# Patient Record
Sex: FEMALE | NOT HISPANIC OR LATINO | URBAN - METROPOLITAN AREA
[De-identification: names, ages, dates, MRNs, and addresses within clinical notes are randomized per-mention and may not be internally consistent; named-entity substitution may affect disease eponyms.]

---

## 2018-01-15 NOTE — PATIENT DISCUSSION
HYPERTENSIVE RETINOPATHY OU WITH BRAO OS - REPERFUSED: IMPORTANCE OF GOOD BLOOD PRESSURE CONTROL STRESSED. KEEP FU WITH PCP AND AS SCHEDULED.

## 2018-01-15 NOTE — PATIENT DISCUSSION
New Prescription: erythromycin (erythromycin): ointment: 5 mg/gram (0.5 %) 1 a small amount at bedtime into both eyes 01-

## 2018-01-15 NOTE — PATIENT DISCUSSION
KAYLIE EXACERBATED BY BLEPHARITIS, OU: PRESCRIBE WARM COMPRESSES AND EYELID SCRUBS QD-BID, ARTIFICIAL TEARS BID-QID, THE DAILY INTAKE OF OMEGA-3 FATTY ACIDS AND ERYTHROMYCIN PEG QHS X2 WEEKS. WILL CONSIDER LIPIFLOW TREATMENT NEXT VISIT IF NOT RESPONSIVE TO TREATMENT OR IF SYMPTOMS PERSIST. RETURN FOR FOLLOW-UP AS SCHEDULED.

## 2018-01-15 NOTE — PATIENT DISCUSSION
Branch Retinal Artery Occlusion Counseling:  I have discussed the diagnosis and its pathophysiology with the patient. I have further explained although there are not treatments that have been shown to reverse the damage done by a retinal artery occlusion, follow-up is necessary to evaluate for serious complications that can occur including the formation of new abnormal blood vessels and elevated eye pressure. Return for follow-up as scheduled.

## 2018-11-19 ENCOUNTER — IMPORTED ENCOUNTER (OUTPATIENT)
Dept: URBAN - METROPOLITAN AREA CLINIC 1 | Facility: CLINIC | Age: 68
End: 2018-11-19

## 2018-11-19 PROBLEM — H16.143: Noted: 2018-11-19

## 2018-11-19 PROBLEM — H04.123: Noted: 2018-11-19

## 2018-11-19 PROBLEM — H43.811: Noted: 2018-11-19

## 2018-11-19 PROBLEM — Z96.1: Noted: 2018-11-19

## 2018-11-19 PROBLEM — H35.371: Noted: 2018-11-19

## 2018-11-19 PROBLEM — D31.31: Noted: 2018-11-19

## 2018-11-19 PROCEDURE — 92004 COMPRE OPH EXAM NEW PT 1/>: CPT

## 2018-11-19 PROCEDURE — 92015 DETERMINE REFRACTIVE STATE: CPT

## 2018-11-19 NOTE — PATIENT DISCUSSION
1.  Epiretinal Membrane OD- Condition discussed w/ pt and reassurance given. Appears benign. Observe for change. 2. Choroidal Nevus OD- Appears Benign. Observe for changes. 3. DOMINICK w/ PEK OU- The use of artificial tears OU BID were recommended routinely. 4.  PVD w/o Tear OD- RD precautions. Observe. 5. Pseudophakia OU (done else where OU)- H/o Yag OU (done else where)6. Return for an appointment for 27 in 1 year with Dr. Elaina Ellington.

## 2019-11-19 ENCOUNTER — IMPORTED ENCOUNTER (OUTPATIENT)
Dept: URBAN - METROPOLITAN AREA CLINIC 1 | Facility: CLINIC | Age: 69
End: 2019-11-19

## 2019-11-19 PROBLEM — H35.371: Noted: 2019-11-19

## 2019-11-19 PROBLEM — H43.811: Noted: 2019-11-19

## 2019-11-19 PROBLEM — Z96.1: Noted: 2019-11-19

## 2019-11-19 PROBLEM — D31.31: Noted: 2019-11-19

## 2019-11-19 PROBLEM — H04.123: Noted: 2019-11-19

## 2019-11-19 PROBLEM — H16.143: Noted: 2019-11-19

## 2019-11-19 PROCEDURE — 92015 DETERMINE REFRACTIVE STATE: CPT

## 2019-11-19 PROCEDURE — 92014 COMPRE OPH EXAM EST PT 1/>: CPT

## 2019-11-19 NOTE — PATIENT DISCUSSION
1.  Epiretinal Membrane OD - Observe for change. 2. Choroidal Nevus OD: Appears Benign and stable. Observe for changes. 3. DOMINICK w/ PEK OU- Recommend ATs QID OU routinely 4. Pseudophakia OU - (done else where OU)- H/o Yag OU (done else where)5. PVD w/o Tear OD - RD precautions. MRX for glasses given. Return for an appointment in 1 year 27 with Dr. Sundar Sharp.

## 2020-11-10 ENCOUNTER — IMPORTED ENCOUNTER (OUTPATIENT)
Dept: URBAN - METROPOLITAN AREA CLINIC 1 | Facility: CLINIC | Age: 70
End: 2020-11-10

## 2020-11-10 PROBLEM — H43.811: Noted: 2020-11-10

## 2020-11-10 PROBLEM — D31.31: Noted: 2020-11-10

## 2020-11-10 PROBLEM — Z96.1: Noted: 2020-11-10

## 2020-11-10 PROBLEM — H35.371: Noted: 2020-11-10

## 2020-11-10 PROBLEM — H04.123: Noted: 2020-11-10

## 2020-11-10 PROBLEM — H16.143: Noted: 2020-11-10

## 2020-11-10 PROCEDURE — 92015 DETERMINE REFRACTIVE STATE: CPT

## 2020-11-10 PROCEDURE — 92014 COMPRE OPH EXAM EST PT 1/>: CPT

## 2020-11-10 NOTE — PATIENT DISCUSSION
1.  Epiretinal Membrane OD - Observe for change. 2. Choroidal Nevus OD: Appears Benign and stable. Observe for changes. 3. DOMINICK w/ PEK OU-The use/continuation of artificial tears were recommended. 4.  Pseudophakia OU - (done else where OU)- H/o YAG Cap OU (done else where)5. PVD w/o Tear OD - RD precautions. MRX for glasses given. Return for an appointment in 1 year 27 with Dr. Marie Sarmiento.

## 2021-08-20 ENCOUNTER — IMPORTED ENCOUNTER (OUTPATIENT)
Dept: URBAN - METROPOLITAN AREA CLINIC 1 | Facility: CLINIC | Age: 71
End: 2021-08-20

## 2021-08-20 PROBLEM — B02.9: Noted: 2021-08-20

## 2021-08-20 PROCEDURE — 99213 OFFICE O/P EST LOW 20 MIN: CPT

## 2021-08-20 NOTE — PATIENT DISCUSSION
DOMINICK w/ PEK OU -- Cont ATs TID OU routinely. 3.  Pseudophakia OU (Done elsewhere) -- H/o Yag Cap OU (done elsewhere)4. Epiretinal Membrane OD -- Stable. Observe for change. 5. Choroidal Nevus OD -- Appears Benign and stable. Observe for changes. 6. PVD w/o Tear OD -- RD precautions. Return for an appointment as scheduled ([phone]) with Dr. Mario Avendano.

## 2021-08-20 NOTE — PATIENT DISCUSSION
1.  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 Cont Valcyclovir 1g PO TID until gone ([facility] 2.  DOMINICK w/ PEK OU -- Cont ATs TID OU routinely. 3.  Pseudophakia OU (Done elsewhere) -- H/o Yag Cap OU (done elsewhere)4. Epiretinal Membrane OD -- Stable. Observe for change. 5. Choroidal Nevus OD -- Appears Benign and stable. Observe for changes. 6. PVD w/o Tear OD -- RD precautions. Return for an appointment as scheduled ([phone]) with Dr. Griselda Aguila.

## 2021-11-09 ENCOUNTER — IMPORTED ENCOUNTER (OUTPATIENT)
Dept: URBAN - METROPOLITAN AREA CLINIC 1 | Facility: CLINIC | Age: 71
End: 2021-11-09

## 2021-11-09 PROBLEM — H35.373: Noted: 2021-11-09

## 2021-11-09 PROBLEM — D31.31: Noted: 2021-11-09

## 2021-11-09 PROBLEM — H04.123: Noted: 2021-11-09

## 2021-11-09 PROBLEM — H35.371: Noted: 2021-11-09

## 2021-11-09 PROBLEM — H16.143: Noted: 2021-11-09

## 2021-11-09 PROCEDURE — 92015 DETERMINE REFRACTIVE STATE: CPT

## 2021-11-09 PROCEDURE — 99214 OFFICE O/P EST MOD 30 MIN: CPT

## 2021-11-09 NOTE — PATIENT DISCUSSION
1.  DOMINICK w/ PEK OU -- Cont ATs TID OU routinely. 2.  Epiretinal Membrane OD -- Stable. Observe for change. 3. Choroidal Nevus OD -- Appears Benign and stable. Observe for changes. 4. 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 5.  Pseudophakia OU (Done elsewhere) -- H/o Yag Cap OU (done elsewhere)6. PVD w/o Tear OD -- RD precautions. Finalized glasses Mrx todayReturn for an appointment in 1 year for a 27 with Dr. Laura Torres.

## 2022-04-02 ASSESSMENT — VISUAL ACUITY
OD_CC: J2
OD_SC: 20/20
OD_SC: 20/30
OS_CC: J1
OD_CC: J1
OD_SC: 20/20
OS_SC: 20/20
OD_SC: 20/40+2
OS_CC: J1
OS_SC: 20/20
OS_SC: 20/20
OD_CC: J1
OS_SC: 20/30
OD_CC: J1
OS_CC: J1
OS_CC: J1
OD_CC: J1
OD_SC: 20/40+1
OS_CC: J1
OS_SC: 20/25

## 2022-04-02 ASSESSMENT — TONOMETRY
OD_IOP_MMHG: 18
OD_IOP_MMHG: 17
OS_IOP_MMHG: 17
OS_IOP_MMHG: 18
OD_IOP_MMHG: 18
OD_IOP_MMHG: 18
OS_IOP_MMHG: 18
OD_IOP_MMHG: 17
OS_IOP_MMHG: 18
OS_IOP_MMHG: 17

## 2022-11-08 ENCOUNTER — COMPREHENSIVE EXAM (OUTPATIENT)
Dept: URBAN - METROPOLITAN AREA CLINIC 1 | Facility: CLINIC | Age: 72
End: 2022-11-08

## 2022-11-08 DIAGNOSIS — H43.813: ICD-10-CM

## 2022-11-08 DIAGNOSIS — H16.143: ICD-10-CM

## 2022-11-08 DIAGNOSIS — H04.123: ICD-10-CM

## 2022-11-08 DIAGNOSIS — D31.31: ICD-10-CM

## 2022-11-08 DIAGNOSIS — H35.371: ICD-10-CM

## 2022-11-08 PROCEDURE — 99214 OFFICE O/P EST MOD 30 MIN: CPT

## 2022-11-08 PROCEDURE — 92015 DETERMINE REFRACTIVE STATE: CPT

## 2022-11-08 ASSESSMENT — VISUAL ACUITY
OS_CC: J1+
OD_CC: 20/20
OS_CC: 20/20-2
OD_CC: J1+

## 2022-11-08 ASSESSMENT — TONOMETRY
OD_IOP_MMHG: 18
OS_IOP_MMHG: 18

## 2024-02-05 ENCOUNTER — COMPREHENSIVE EXAM (OUTPATIENT)
Dept: URBAN - METROPOLITAN AREA CLINIC 1 | Facility: CLINIC | Age: 74
End: 2024-02-05

## 2024-02-05 DIAGNOSIS — Z96.1: ICD-10-CM

## 2024-02-05 DIAGNOSIS — H35.373: ICD-10-CM

## 2024-02-05 DIAGNOSIS — H04.123: ICD-10-CM

## 2024-02-05 DIAGNOSIS — H43.813: ICD-10-CM

## 2024-02-05 PROCEDURE — 99214 OFFICE O/P EST MOD 30 MIN: CPT

## 2024-02-05 PROCEDURE — 92015 DETERMINE REFRACTIVE STATE: CPT

## 2024-02-05 ASSESSMENT — VISUAL ACUITY
OD_CC: J1+
OD_CC: 20/20
OS_CC: 20/25
OS_CC: J1

## 2024-02-05 ASSESSMENT — TONOMETRY
OD_IOP_MMHG: 17
OS_IOP_MMHG: 17

## 2025-02-10 ENCOUNTER — COMPREHENSIVE EXAM (OUTPATIENT)
Age: 75
End: 2025-02-10

## 2025-02-10 DIAGNOSIS — H35.373: ICD-10-CM

## 2025-02-10 DIAGNOSIS — D31.31: ICD-10-CM

## 2025-02-10 DIAGNOSIS — H04.123: ICD-10-CM

## 2025-02-10 DIAGNOSIS — H43.813: ICD-10-CM

## 2025-02-10 DIAGNOSIS — Z96.1: ICD-10-CM

## 2025-02-10 DIAGNOSIS — H16.143: ICD-10-CM

## 2025-02-10 PROCEDURE — 92015 DETERMINE REFRACTIVE STATE: CPT

## 2025-02-10 PROCEDURE — 99214 OFFICE O/P EST MOD 30 MIN: CPT
